# Patient Record
Sex: FEMALE | Race: WHITE | NOT HISPANIC OR LATINO | Employment: STUDENT | URBAN - METROPOLITAN AREA
[De-identification: names, ages, dates, MRNs, and addresses within clinical notes are randomized per-mention and may not be internally consistent; named-entity substitution may affect disease eponyms.]

---
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
## 2019-02-07 ENCOUNTER — PATIENT MESSAGE (OUTPATIENT)
Dept: ADMINISTRATIVE | Facility: OTHER | Age: 19
End: 2019-02-07

## 2019-02-07 ENCOUNTER — TELEPHONE (OUTPATIENT)
Dept: PEDIATRIC CARDIOLOGY | Facility: CLINIC | Age: 19
End: 2019-02-07

## 2019-02-07 NOTE — TELEPHONE ENCOUNTER
Spoke to patient markus cardi MRI missed appointment today. Pt states she missed her appointment because [facility] called yesterday and told her she would have to pay $2,000+ in order to get the test done because she has not yet met her deductible. Pt is stating she does not have the funds right now in order to afford that and doesn't know what to do. Encouraged pt to call her insurance company to see if there is anything we can do for her to get it covered and will send the pt financial assistance number to call as well.     ----- Message from Liz Christiansen MD sent at 2/7/2019  1:57 PM CST -----  Regarding: missed MRI  Patient cancelled MRI. I have asked Gin to reschedle

## 2019-02-11 ENCOUNTER — TELEPHONE (OUTPATIENT)
Dept: PEDIATRIC CARDIOLOGY | Facility: CLINIC | Age: 19
End: 2019-02-11

## 2019-02-11 RX ORDER — LOSARTAN POTASSIUM 50 MG/1
50 TABLET ORAL DAILY
Qty: 30 TABLET | Refills: 11 | Status: SHIPPED | OUTPATIENT
Start: 2019-02-11

## 2019-02-11 NOTE — TELEPHONE ENCOUNTER
Left voicemail, scheduled Elizabeth a f/u appointment with Dr Logan on Mar 11, 2019 in [place]. Provided call back name and number. Will mail out appointment slip today.

## 2019-02-11 NOTE — TELEPHONE ENCOUNTER
I recently saw this patient in my [place] clinic.  She inquired about stimulant medication for ADHD.  Given her Marfan syndrome, dilated aortic root, and concerns about hypertension, I recommended that we get a cardiac MRI to assess her aortic root before starting those medications.  Unfortunately, her deductible is prohibitively high at present for her to get the MRI.  We should be able to get the study at the end of the year.  She is struggling in school.    I am clearing her for stimulant medications from a cardiac standpoint.  However, I am going up on her losartan dose to 50 mg daily.  I want to see her in clinic within 2-4 weeks to recheck blood pressure.  Ideally, her systolic blood pressure would be less than 130.  We still need to get the MRI or a CTA this year.    I have sent a letter clearing her for stimulant medication to the primary provider, Ms. Rehan Green.

## 2019-12-23 ENCOUNTER — HISTORICAL (OUTPATIENT)
Dept: ADMINISTRATIVE | Facility: HOSPITAL | Age: 19
End: 2019-12-23

## 2019-12-23 LAB
ESTRADIOL SERPL HS-MCNC: 47 PG/ML
PROGEST SERPL-MCNC: 0.78 NG/ML

## 2020-05-19 ENCOUNTER — TELEPHONE (OUTPATIENT)
Dept: PEDIATRICS | Facility: CLINIC | Age: 20
End: 2020-05-19

## 2020-05-19 ENCOUNTER — TELEPHONE (OUTPATIENT)
Dept: PEDIATRIC CARDIOLOGY | Facility: CLINIC | Age: 20
End: 2020-05-19

## 2020-06-23 ENCOUNTER — HISTORICAL (OUTPATIENT)
Dept: ADMINISTRATIVE | Facility: HOSPITAL | Age: 20
End: 2020-06-23

## 2020-06-23 LAB
ESTRADIOL SERPL HS-MCNC: 36 PG/ML
PROGEST SERPL-MCNC: 1.3 NG/ML

## 2020-08-12 ENCOUNTER — TELEPHONE (OUTPATIENT)
Dept: PEDIATRIC CARDIOLOGY | Facility: CLINIC | Age: 20
End: 2020-08-12

## 2021-05-12 ENCOUNTER — PATIENT MESSAGE (OUTPATIENT)
Dept: RESEARCH | Facility: HOSPITAL | Age: 21
End: 2021-05-12

## 2021-10-11 ENCOUNTER — HISTORICAL (OUTPATIENT)
Dept: ADMINISTRATIVE | Facility: HOSPITAL | Age: 21
End: 2021-10-11

## 2021-10-11 LAB
ALBUMIN SERPL-MCNC: 4.2 GM/DL (ref 3.5–5)
ALBUMIN/GLOB SERPL: 1.3 RATIO (ref 1.1–2)
ALP SERPL-CCNC: 83 UNIT/L (ref 40–150)
ALT SERPL-CCNC: 16 UNIT/L (ref 0–55)
AST SERPL-CCNC: 18 UNIT/L (ref 5–34)
BILIRUB SERPL-MCNC: 0.5 MG/DL
BILIRUBIN DIRECT+TOT PNL SERPL-MCNC: 0.2 MG/DL (ref 0–0.5)
BILIRUBIN DIRECT+TOT PNL SERPL-MCNC: 0.3 MG/DL (ref 0–0.8)
BUN SERPL-MCNC: 15.9 MG/DL (ref 7–18.7)
CALCIUM SERPL-MCNC: 10.3 MG/DL (ref 8.4–10.2)
CHLORIDE SERPL-SCNC: 106 MMOL/L (ref 98–107)
CO2 SERPL-SCNC: 23 MMOL/L (ref 22–29)
CREAT SERPL-MCNC: 0.94 MG/DL (ref 0.55–1.02)
GLOBULIN SER-MCNC: 3.3 GM/DL (ref 2.4–3.5)
GLUCOSE SERPL-MCNC: 87 MG/DL (ref 74–100)
POTASSIUM SERPL-SCNC: 4.7 MMOL/L (ref 3.5–5.1)
PROT SERPL-MCNC: 7.5 GM/DL (ref 6.4–8.3)
SODIUM SERPL-SCNC: 140 MMOL/L (ref 136–145)
TESTOST SERPL-MCNC: 81.84 NG/DL (ref 13.84–53.35)
VIT B12 SERPL-MCNC: 940 PG/ML (ref 213–816)